# Patient Record
Sex: FEMALE | Race: WHITE | Employment: UNEMPLOYED | URBAN - NONMETROPOLITAN AREA
[De-identification: names, ages, dates, MRNs, and addresses within clinical notes are randomized per-mention and may not be internally consistent; named-entity substitution may affect disease eponyms.]

---

## 2024-12-09 ENCOUNTER — OFFICE VISIT (OUTPATIENT)
Dept: FAMILY MEDICINE CLINIC | Age: 44
End: 2024-12-09
Payer: COMMERCIAL

## 2024-12-09 VITALS
SYSTOLIC BLOOD PRESSURE: 112 MMHG | OXYGEN SATURATION: 98 % | BODY MASS INDEX: 22.28 KG/M2 | RESPIRATION RATE: 18 BRPM | HEART RATE: 95 BPM | TEMPERATURE: 97.1 F | HEIGHT: 68 IN | WEIGHT: 147 LBS | DIASTOLIC BLOOD PRESSURE: 66 MMHG

## 2024-12-09 DIAGNOSIS — M54.50 ACUTE BILATERAL LOW BACK PAIN WITHOUT SCIATICA: Primary | ICD-10-CM

## 2024-12-09 PROCEDURE — 3074F SYST BP LT 130 MM HG: CPT

## 2024-12-09 PROCEDURE — 99213 OFFICE O/P EST LOW 20 MIN: CPT

## 2024-12-09 PROCEDURE — 3078F DIAST BP <80 MM HG: CPT

## 2024-12-09 RX ORDER — METHYLPREDNISOLONE 4 MG/1
TABLET ORAL
Qty: 1 KIT | Refills: 0 | Status: SHIPPED | OUTPATIENT
Start: 2024-12-09

## 2024-12-09 RX ORDER — METHOCARBAMOL 750 MG/1
750 TABLET, FILM COATED ORAL 4 TIMES DAILY
Qty: 40 TABLET | Refills: 0 | Status: SHIPPED | OUTPATIENT
Start: 2024-12-09 | End: 2024-12-19

## 2024-12-09 NOTE — PROGRESS NOTES
Chief Complaint:   Back Pain      History of Present Illness   Source of history provided by:  patient.     Janelle Sommer is a 44 y.o. old female who presents to the walk in clinic for evaluation of bilateral low back pain for the past few weeks. Pt denies any known injury. Pt has had back problems in the past.  Pt states the pain is worse with movement and improves with lying flat. There is no radiation of the pain into the buttocks/leg. Pt denies any bowel/bladder incontinence, abdominal pain, hematuria, N/V/D, fever, chills, HA, neck pain, recent illness, dysuria, or lethargy.    Review of Systems    Unless otherwise stated in this report or unable to obtain because of the patient's clinical or mental status as evidenced by the medical record, this patients's positive and negative responses for Review of Systems, constitutional, psych, eyes, ENT, cardiovascular, respiratory, gastrointestinal, neurological, genitourinary, musculoskeletal, integument systems and systems related to the presenting problem are either stated in the preceding or were not pertinent or were negative for the symptoms and/or complaints related to the medical problem.    Past Medical History:  has a past medical history of Essential hypertension and Postpartum depression.   Past Surgical History:  has a past surgical history that includes  section.   Social History:  reports that she has never smoked. She has never used smokeless tobacco. She reports that she does not drink alcohol and does not use drugs.  Family History: family history includes Cancer in her maternal grandfather.  Allergies: Penicillins and Flexeril [cyclobenzaprine]    Physical Exam   Vital Signs:  /66   Pulse 95   Temp 97.1 °F (36.2 °C) (Temporal)   Resp 18   Ht 1.727 m (5' 8\")   Wt 66.7 kg (147 lb)   SpO2 98%   BMI 22.35 kg/m²    Oxygen Saturation Interpretation: Normal.    Constitutional:  Alert, development consistent with age.  Neck:  Normal

## 2025-03-19 ENCOUNTER — OFFICE VISIT (OUTPATIENT)
Dept: FAMILY MEDICINE CLINIC | Age: 45
End: 2025-03-19
Payer: COMMERCIAL

## 2025-03-19 ENCOUNTER — APPOINTMENT (OUTPATIENT)
Dept: CT IMAGING | Age: 45
End: 2025-03-19
Payer: COMMERCIAL

## 2025-03-19 ENCOUNTER — APPOINTMENT (OUTPATIENT)
Dept: ULTRASOUND IMAGING | Age: 45
End: 2025-03-19
Payer: COMMERCIAL

## 2025-03-19 ENCOUNTER — HOSPITAL ENCOUNTER (EMERGENCY)
Age: 45
Discharge: HOME | End: 2025-03-19
Attending: EMERGENCY MEDICINE
Payer: COMMERCIAL

## 2025-03-19 VITALS
SYSTOLIC BLOOD PRESSURE: 128 MMHG | TEMPERATURE: 97.9 F | WEIGHT: 143 LBS | OXYGEN SATURATION: 100 % | HEIGHT: 68 IN | DIASTOLIC BLOOD PRESSURE: 106 MMHG | HEART RATE: 71 BPM | RESPIRATION RATE: 17 BRPM | BODY MASS INDEX: 21.67 KG/M2

## 2025-03-19 VITALS
BODY MASS INDEX: 22.13 KG/M2 | SYSTOLIC BLOOD PRESSURE: 108 MMHG | OXYGEN SATURATION: 98 % | TEMPERATURE: 97.5 F | RESPIRATION RATE: 18 BRPM | HEART RATE: 81 BPM | DIASTOLIC BLOOD PRESSURE: 66 MMHG | HEIGHT: 68 IN | WEIGHT: 146 LBS

## 2025-03-19 DIAGNOSIS — R10.11 RIGHT UPPER QUADRANT ABDOMINAL PAIN: Primary | ICD-10-CM

## 2025-03-19 DIAGNOSIS — R93.89 ABNORMAL CT SCAN: ICD-10-CM

## 2025-03-19 DIAGNOSIS — R10.9 RIGHT FLANK PAIN: Primary | ICD-10-CM

## 2025-03-19 LAB
A/G RATIO: 1.9 RATIO (ref 1.1–2.2)
ALBUMIN SERPL-MCNC: 4.4 G/DL (ref 3.5–5.2)
ALBUMIN: 4.4 G/DL (ref 3.5–5)
ALP BLD-CCNC: 44 U/L (ref 42–121)
ALP SERPL-CCNC: 52 U/L (ref 35–104)
ALT SERPL-CCNC: 17 U/L (ref 7–52)
ALT SERPL-CCNC: 20 U/L (ref 0–32)
AMORPH SED URNS QL MICRO: PRESENT
ANION GAP SERPL CALCULATED.3IONS-SCNC: 14 MMOL/L (ref 7–16)
ANION GAP SERPL CALCULATED.3IONS-SCNC: 4 MEQ/L (ref 4–14)
APAP SERPL-MCNC: <5 UG/ML (ref 10–30)
AST SERPL-CCNC: 14 U/L (ref 10–41)
AST SERPL-CCNC: 15 U/L (ref 0–31)
BACTERIA URNS QL MICRO: ABNORMAL
BASOPHILS # BLD: 0.05 K/UL (ref 0–0.2)
BASOPHILS ABSOLUTE: 0 K/UL (ref 0–0.2)
BASOPHILS NFR BLD: 1 % (ref 0–2)
BASOPHILS RELATIVE PERCENT: 0.7 % (ref 0–1.5)
BILIRUB SERPL-MCNC: 0.2 MG/DL (ref 0–1.2)
BILIRUB SERPL-MCNC: 0.5 MG/DL (ref 0.3–1.5)
BILIRUB UR QL STRIP: NEGATIVE
BILIRUBIN, POC: NORMAL
BLOOD URINE, POC: NORMAL
BUN BLDV-MCNC: 10 MG/DL (ref 7–25)
BUN SERPL-MCNC: 11 MG/DL (ref 6–20)
CALCIUM SERPL-MCNC: 10.1 MG/DL (ref 8.6–10.2)
CALCIUM SERPL-MCNC: 9.1 MG/DL (ref 8.5–10.5)
CHLORIDE BLD-SCNC: 109 MEQ/L (ref 98–107)
CHLORIDE SERPL-SCNC: 108 MMOL/L (ref 98–107)
CLARITY UR: CLEAR
CLARITY, POC: CLEAR
CO2 SERPL-SCNC: 20 MMOL/L (ref 22–29)
CO2: 23 MEQ/L (ref 21–31)
COLOR UR: YELLOW
COLOR, POC: YELLOW
CONTROL: NORMAL
CREAT SERPL-MCNC: 0.94 MG/DL (ref 0.6–1.2)
CREAT SERPL-MCNC: 1.2 MG/DL (ref 0.5–1)
CREATININE + EGFR PANEL: 78 ML/MIN
EOSINOPHIL # BLD: 0.13 K/UL (ref 0.05–0.5)
EOSINOPHILS ABSOLUTE: 0.2 K/UL (ref 0–0.33)
EOSINOPHILS RELATIVE PERCENT: 1 % (ref 0–6)
EOSINOPHILS RELATIVE PERCENT: 2.1 % (ref 0–3)
EPI CELLS #/AREA URNS HPF: ABNORMAL /HPF
ERYTHROCYTE [DISTWIDTH] IN BLOOD BY AUTOMATED COUNT: 12.8 % (ref 11.5–15)
GFR NON-AFRICAN AMERICAN: 65 ML/MIN
GFR, ESTIMATED: 59 ML/MIN/1.73M2
GLOBULIN: 2.3 G/DL (ref 1.9–3.9)
GLUCOSE BLD-MCNC: 88 MG/DL (ref 70–99)
GLUCOSE SERPL-MCNC: 95 MG/DL (ref 74–99)
GLUCOSE UR STRIP-MCNC: NEGATIVE MG/DL
GLUCOSE URINE, POC: NORMAL MG/DL
HCG, URINE, POC: NEGATIVE
HCT VFR BLD AUTO: 44.2 % (ref 34–48)
HCT VFR BLD CALC: 44.9 % (ref 36–44)
HEMOGLOBIN: 15.2 G/DL (ref 12–15)
HGB BLD-MCNC: 14.6 G/DL (ref 11.5–15.5)
HGB UR QL STRIP.AUTO: NEGATIVE
IMM GRANULOCYTES # BLD AUTO: 0.03 K/UL (ref 0–0.58)
IMM GRANULOCYTES NFR BLD: 0 % (ref 0–5)
KETONES UR STRIP-MCNC: NEGATIVE MG/DL
KETONES, POC: NORMAL MG/DL
LEUKOCYTE EST, POC: NORMAL
LEUKOCYTE ESTERASE UR QL STRIP: NEGATIVE
LIPASE SERPL-CCNC: 32 U/L (ref 13–60)
LYMPHOCYTES ABSOLUTE: 2.2 K/UL (ref 1.1–4.8)
LYMPHOCYTES NFR BLD: 2.54 K/UL (ref 1.5–4)
LYMPHOCYTES RELATIVE PERCENT: 27 % (ref 20–42)
LYMPHOCYTES RELATIVE PERCENT: 30.8 % (ref 24–44)
Lab: NORMAL
MCH RBC QN AUTO: 31.2 PG (ref 26–35)
MCH RBC QN AUTO: 31.4 PG (ref 28–34)
MCHC RBC AUTO-ENTMCNC: 33 G/DL (ref 32–34.5)
MCHC RBC AUTO-ENTMCNC: 33.8 G/DL (ref 33–37)
MCV RBC AUTO: 92.8 FL (ref 80–100)
MCV RBC AUTO: 94.4 FL (ref 80–99.9)
MONOCYTES ABSOLUTE: 0.6 K/UL (ref 0.2–0.7)
MONOCYTES NFR BLD: 1.04 K/UL (ref 0.1–0.95)
MONOCYTES NFR BLD: 11 % (ref 2–12)
MONOCYTES RELATIVE PERCENT: 8.5 % (ref 3.4–9)
NEGATIVE QC PASS/FAIL: NORMAL
NEUTROPHILS ABSOLUTE: 4.1 K/UL (ref 1.83–8.7)
NEUTROPHILS NFR BLD: 59 % (ref 43–80)
NEUTROPHILS RELATIVE PERCENT: 57.9 % (ref 40–74)
NEUTS SEG NFR BLD: 5.5 K/UL (ref 1.8–7.3)
NITRITE UR QL STRIP: NEGATIVE
NITRITE, POC: NORMAL
PDW BLD-RTO: 13.4 % (ref 10.9–14.3)
PH UR STRIP: 6.5 [PH] (ref 5–8)
PH, POC: 6
PLATELET # BLD AUTO: 174 K/UL (ref 130–450)
PLATELET # BLD: 167 K/UL (ref 150–450)
PMV BLD AUTO: 10.6 FL (ref 7.4–10.4)
PMV BLD AUTO: 12.7 FL (ref 7–12)
POSITIVE QC PASS/FAIL: NORMAL
POTASSIUM SERPL-SCNC: 4.3 MMOL/L (ref 3.5–5)
POTASSIUM SERPL-SCNC: 4.5 MEQ/L (ref 3.6–5)
PREGNANCY TEST URINE, POC: NEGATIVE
PROT SERPL-MCNC: 7.2 G/DL (ref 6.4–8.3)
PROT UR STRIP-MCNC: NEGATIVE MG/DL
PROTEIN, POC: NORMAL MG/DL
RBC # BLD AUTO: 4.68 M/UL (ref 3.5–5.5)
RBC # BLD: 4.84 M/UL (ref 4–4.9)
RBC #/AREA URNS HPF: ABNORMAL /HPF
SODIUM BLD-SCNC: 136 MEQ/L (ref 135–145)
SODIUM SERPL-SCNC: 142 MMOL/L (ref 132–146)
SP GR UR STRIP: 1.01 (ref 1–1.03)
SPECIFIC GRAVITY, POC: 1.02
TOTAL PROTEIN: 6.7 G/DL (ref 6.2–8)
UROBILINOGEN UR STRIP-ACNC: 0.2 EU/DL (ref 0–1)
UROBILINOGEN, POC: 0.2 MG/DL
WBC # BLD: 7.1 K/UL (ref 4.5–11)
WBC #/AREA URNS HPF: ABNORMAL /HPF
WBC OTHER # BLD: 9.3 K/UL (ref 4.5–11.5)

## 2025-03-19 PROCEDURE — 85025 COMPLETE CBC W/AUTO DIFF WBC: CPT

## 2025-03-19 PROCEDURE — 99285 EMERGENCY DEPT VISIT HI MDM: CPT

## 2025-03-19 PROCEDURE — 81002 URINALYSIS NONAUTO W/O SCOPE: CPT | Performed by: PHYSICIAN ASSISTANT

## 2025-03-19 PROCEDURE — 74177 CT ABD & PELVIS W/CONTRAST: CPT

## 2025-03-19 PROCEDURE — 6360000002 HC RX W HCPCS: Performed by: EMERGENCY MEDICINE

## 2025-03-19 PROCEDURE — 96372 THER/PROPH/DIAG INJ SC/IM: CPT | Performed by: PHYSICIAN ASSISTANT

## 2025-03-19 PROCEDURE — 99214 OFFICE O/P EST MOD 30 MIN: CPT | Performed by: PHYSICIAN ASSISTANT

## 2025-03-19 PROCEDURE — 76705 ECHO EXAM OF ABDOMEN: CPT

## 2025-03-19 PROCEDURE — 81025 URINE PREGNANCY TEST: CPT | Performed by: PHYSICIAN ASSISTANT

## 2025-03-19 PROCEDURE — 6370000000 HC RX 637 (ALT 250 FOR IP)

## 2025-03-19 PROCEDURE — 83690 ASSAY OF LIPASE: CPT

## 2025-03-19 PROCEDURE — 80053 COMPREHEN METABOLIC PANEL: CPT

## 2025-03-19 PROCEDURE — 3074F SYST BP LT 130 MM HG: CPT | Performed by: PHYSICIAN ASSISTANT

## 2025-03-19 PROCEDURE — 96374 THER/PROPH/DIAG INJ IV PUSH: CPT

## 2025-03-19 PROCEDURE — 96375 TX/PRO/DX INJ NEW DRUG ADDON: CPT

## 2025-03-19 PROCEDURE — 3078F DIAST BP <80 MM HG: CPT | Performed by: PHYSICIAN ASSISTANT

## 2025-03-19 PROCEDURE — 6360000004 HC RX CONTRAST MEDICATION: Performed by: RADIOLOGY

## 2025-03-19 PROCEDURE — 81001 URINALYSIS AUTO W/SCOPE: CPT

## 2025-03-19 PROCEDURE — 80143 DRUG ASSAY ACETAMINOPHEN: CPT

## 2025-03-19 RX ORDER — KETOROLAC TROMETHAMINE 30 MG/ML
30 INJECTION, SOLUTION INTRAMUSCULAR; INTRAVENOUS ONCE
Status: COMPLETED | OUTPATIENT
Start: 2025-03-19 | End: 2025-03-19

## 2025-03-19 RX ORDER — ONDANSETRON 2 MG/ML
4 INJECTION INTRAMUSCULAR; INTRAVENOUS ONCE
Status: COMPLETED | OUTPATIENT
Start: 2025-03-19 | End: 2025-03-19

## 2025-03-19 RX ORDER — IBUPROFEN 400 MG/1
TABLET, FILM COATED ORAL
Status: DISCONTINUED
Start: 2025-03-19 | End: 2025-03-19

## 2025-03-19 RX ORDER — KETOROLAC TROMETHAMINE 15 MG/ML
15 INJECTION, SOLUTION INTRAMUSCULAR; INTRAVENOUS ONCE
Status: COMPLETED | OUTPATIENT
Start: 2025-03-19 | End: 2025-03-19

## 2025-03-19 RX ORDER — BISACODYL 5 MG
5 TABLET, DELAYED RELEASE (ENTERIC COATED) ORAL DAILY PRN
COMMUNITY
Start: 2025-02-26

## 2025-03-19 RX ORDER — IOPAMIDOL 755 MG/ML
75 INJECTION, SOLUTION INTRAVASCULAR
Status: COMPLETED | OUTPATIENT
Start: 2025-03-19 | End: 2025-03-19

## 2025-03-19 RX ORDER — TOPIRAMATE 50 MG/1
50 TABLET, FILM COATED ORAL 2 TIMES DAILY
COMMUNITY
Start: 2025-02-26

## 2025-03-19 RX ORDER — IBUPROFEN 400 MG/1
400 TABLET, FILM COATED ORAL
Status: DISCONTINUED | OUTPATIENT
Start: 2025-03-20 | End: 2025-03-20 | Stop reason: HOSPADM

## 2025-03-19 RX ORDER — RIZATRIPTAN BENZOATE 10 MG/1
10 TABLET, ORALLY DISINTEGRATING ORAL
COMMUNITY
Start: 2025-02-26

## 2025-03-19 RX ORDER — IBUPROFEN 400 MG/1
400 TABLET, FILM COATED ORAL ONCE
Status: COMPLETED | OUTPATIENT
Start: 2025-03-19 | End: 2025-03-19

## 2025-03-19 RX ADMIN — IOPAMIDOL 75 ML: 755 INJECTION, SOLUTION INTRAVENOUS at 18:29

## 2025-03-19 RX ADMIN — KETOROLAC TROMETHAMINE 15 MG: 15 INJECTION, SOLUTION INTRAMUSCULAR; INTRAVENOUS at 17:55

## 2025-03-19 RX ADMIN — IBUPROFEN 400 MG: 400 TABLET, FILM COATED ORAL at 21:28

## 2025-03-19 RX ADMIN — ONDANSETRON 4 MG: 2 INJECTION, SOLUTION INTRAMUSCULAR; INTRAVENOUS at 17:54

## 2025-03-19 RX ADMIN — KETOROLAC TROMETHAMINE 30 MG: 30 INJECTION, SOLUTION INTRAMUSCULAR; INTRAVENOUS at 10:21

## 2025-03-19 ASSESSMENT — PAIN DESCRIPTION - LOCATION
LOCATION: BACK;ABDOMEN
LOCATION: ABDOMEN
LOCATION: ABDOMEN

## 2025-03-19 ASSESSMENT — ENCOUNTER SYMPTOMS
COUGH: 0
SORE THROAT: 0
SHORTNESS OF BREATH: 0
ABDOMINAL PAIN: 0
NAUSEA: 0
BACK PAIN: 0
VOMITING: 0
DIARRHEA: 0
PHOTOPHOBIA: 0

## 2025-03-19 ASSESSMENT — PAIN DESCRIPTION - DESCRIPTORS: DESCRIPTORS: SHARP;THROBBING

## 2025-03-19 ASSESSMENT — PAIN SCALES - GENERAL
PAINLEVEL_OUTOF10: 6

## 2025-03-19 ASSESSMENT — PAIN DESCRIPTION - ORIENTATION: ORIENTATION: RIGHT

## 2025-03-19 ASSESSMENT — LIFESTYLE VARIABLES
HOW OFTEN DO YOU HAVE A DRINK CONTAINING ALCOHOL: NEVER
HOW MANY STANDARD DRINKS CONTAINING ALCOHOL DO YOU HAVE ON A TYPICAL DAY: PATIENT DOES NOT DRINK

## 2025-03-19 ASSESSMENT — PAIN DESCRIPTION - PAIN TYPE: TYPE: ACUTE PAIN

## 2025-03-19 ASSESSMENT — PAIN DESCRIPTION - FREQUENCY: FREQUENCY: CONTINUOUS

## 2025-03-19 NOTE — ED PROVIDER NOTES
was given prescription for Naprosyn.  She was instructed to avoid taking Tylenol.    ED COURSE:  ED Course as of 03/19/25 2113   Wed Mar 19, 2025   2108 Further discussion with the patient she stated that she has been taking 500 mg of Tylenol 2 tablets every 3-4 hours for the last 3 days.    Patient was told to stop taking Tylenol to be given at Tylenol level.  This very well could be causing her abdominal pain.    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 [JN]      ED Course User Index  [JN] Magdy Carlisle DO       Counseling:   The emergency provider has spoken with the patient and discussed today’s results, in addition to providing specific details for the plan of care and counseling regarding the diagnosis and prognosis.  Questions are answered at this time and they are agreeable with the plan.      --------------------------------- IMPRESSION AND DISPOSITION ---------------------------------    IMPRESSION  1. Right upper quadrant abdominal pain    2. Abnormal CT scan        DISPOSITION  Disposition: Discharge to home  Patient condition is stable      NOTE: This report was transcribed using voice recognition software. Every effort was made to ensure accuracy; however, inadvertent computerized transcription errors may be present       Magdy Carlisle DO  03/19/25 2116

## 2025-03-19 NOTE — PROGRESS NOTES
pelvis.         IMPRESSION:         Abdomen, single view:    1.  3 mm stone lower pole left kidney.              Results for orders placed or performed in visit on 03/19/25   CBC with Auto Differential   Result Value Ref Range    WBC 7.1 4.5 - 11.0 K/uL    RBC 4.84 4.00 - 4.90 M/uL    Hemoglobin 15.2 (H) 12.0 - 15.0 g/dL    Hematocrit 44.9 (H) 36.0 - 44.0 %    MCV 92.8 80.0 - 100.0 fL    MCH 31.4 28.0 - 34.0 pg    MCHC 33.8 33.0 - 37.0 g/dL    RDW 13.4 10.9 - 14.3 %    Platelets 167 150 - 450 K/uL    MPV 10.6 (H) 7.4 - 10.4 fL    Neutrophils % 57.9 40.0 - 74.0 %    Lymphocytes % 30.8 24.0 - 44.0 %    Monocytes % 8.5 3.4 - 9.0 %    Eosinophils % 2.1 0.0 - 3.0 %    Basophils % 0.7 0.0 - 1.5 %    Neutrophils Absolute 4.1 1.83 - 8.70 K/uL    Lymphocytes Absolute 2.2 1.10 - 4.80 K/uL    Monocytes Absolute 0.6 0.20 - 0.70 K/uL    Eosinophils Absolute 0.2 0.00 - 0.33 K/uL    Basophils Absolute 0.0 0.00 - 0.20 K/uL   Comprehensive Metabolic Panel   Result Value Ref Range    Sodium 136 135 - 145 mEq/L    Potassium 4.5 3.6 - 5.0 mEq/L    Chloride 109 (H) 98 - 107 mEq/L    CO2 23 21 - 31 mEq/L    Anion Gap 4 4 - 14 mEq/L    BUN 10 7 - 25 mg/dL    Creatinine 0.94 0.60 - 1.20 mg/dL    GFR Non-African American 65 >60 mL/min    Creatinine + eGFR Panel 78 >60 mL/min    Glucose 88 70 - 99 mg/dL    Calcium 9.1 8.5 - 10.5 mg/dL    Total Bilirubin 0.5 0.3 - 1.5 mg/dL    Alkaline Phosphatase 44 42 - 121 U/L    AST 14 10 - 41 U/L    ALT 17 7 - 52 U/L    Total Protein 6.7 6.2 - 8.0 g/dL    Albumin 4.4 3.5 - 5.0 g/dL    Globulin 2.3 1.9 - 3.9 g/dL    Albumin/Globulin Ratio 1.9 1.1 - 2.2 ratio   POCT Urinalysis no Micro   Result Value Ref Range    Color, UA yellow     Clarity, UA clear     Glucose, UA POC neg mg/dL    Bilirubin, UA neg     Ketones, UA neg mg/dL    Spec Grav, UA 1.025     Blood, UA POC neg     pH, UA 6.0     Protein, UA POC neg mg/dL    Urobilinogen, UA 0.2 mg/dL    Leukocytes, UA neg     Nitrite, UA neg    POCT urine

## 2025-03-28 ENCOUNTER — TRANSCRIBE ORDERS (OUTPATIENT)
Dept: ADMINISTRATIVE | Age: 45
End: 2025-03-28

## 2025-03-28 DIAGNOSIS — R10.11 RIGHT UPPER QUADRANT PAIN: Primary | ICD-10-CM

## 2025-04-01 ENCOUNTER — HOSPITAL ENCOUNTER (OUTPATIENT)
Age: 45
Discharge: HOME | End: 2025-04-03

## 2025-04-07 LAB — SURGICAL PATHOLOGY REPORT: NORMAL
